# Patient Record
(demographics unavailable — no encounter records)

---

## 2025-04-28 NOTE — PHYSICAL EXAM
[Normal] : was normal [Left Side] : on the left [None] : there was no rectal mass  [de-identified] : Left posterior deep anal fissure with mild indurated edges and extensive large/broad-based apical tag

## 2025-04-28 NOTE — HISTORY OF PRESENT ILLNESS
[FreeTextEntry1] : 32 y/o M presents for initial evaluation o  Referred by friend who's a patient.   PMH: Denies  PSH: Ligament thumb surgery, Anal skin tag removal All: NKDA Meds: Descovy FMH: Denies CRC/IBD Colonoscopy: Never done  Patient presents today for left sided rectal pain for a number of year states feels like wound that is not healing. Patient reports pain is always associated with anal receptive sex. Patient reports occasional bleeding noted on toilet paper, last time saw blood was yesterday.   Also reporting external tissue swelling on left side.  Patient reports he thinks he's had fissures which have resolved on their own.  Has tried OTC creams with mild relief but symptoms come back. Has not been using anything consistenty recently.   Saw CRS at [facility] in 01/2022 for skin tag removal which patient reports went well.   Reports saw a CRS about 2 weeks ago for this pain and was told to stop bottoming. But no other solution or treatment was offered. Is here for another opinion   Moves bowels 2x a day, fully formed, +does admit to straining, does admit to extended time on commode  Patient is physically active, lifts weights

## 2025-04-28 NOTE — ASSESSMENT
[FreeTextEntry1] : Chronic anal fissure.  Recommend trial of conservative medical therapy with topical nifedipine/lidocaine ointment and stool softeners.  However I have outlined to him possible role of surgery including anal dilation and tag excision with biopsy to exclude underlying malignancy.  Patient stands that surgery may require extended recovery, recurrent tags, and persistent fissure and require further surgery.  I have reviewed with him the risks, benefits alternatives.  All questions answered.